# Patient Record
Sex: FEMALE | Race: NATIVE HAWAIIAN OR OTHER PACIFIC ISLANDER | NOT HISPANIC OR LATINO | URBAN - METROPOLITAN AREA
[De-identification: names, ages, dates, MRNs, and addresses within clinical notes are randomized per-mention and may not be internally consistent; named-entity substitution may affect disease eponyms.]

---

## 2024-10-28 ENCOUNTER — EMERGENCY (EMERGENCY)
Facility: HOSPITAL | Age: 34
LOS: 1 days | Discharge: HOME | End: 2024-10-28
Admitting: STUDENT IN AN ORGANIZED HEALTH CARE EDUCATION/TRAINING PROGRAM
Payer: COMMERCIAL

## 2024-10-28 VITALS
HEIGHT: 63 IN | OXYGEN SATURATION: 99 % | HEART RATE: 70 BPM | TEMPERATURE: 98 F | RESPIRATION RATE: 16 BRPM | DIASTOLIC BLOOD PRESSURE: 74 MMHG | SYSTOLIC BLOOD PRESSURE: 108 MMHG | WEIGHT: 149.91 LBS
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
## 2024-10-28 DIAGNOSIS — M79.662 PAIN IN LEFT LOWER LEG: ICD-10-CM
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
## 2024-10-28 DIAGNOSIS — M54.9 DORSALGIA, UNSPECIFIED: ICD-10-CM

## 2024-10-28 PROCEDURE — 71046 X-RAY EXAM CHEST 2 VIEWS: CPT | Mod: 26

## 2024-10-28 PROCEDURE — 73610 X-RAY EXAM OF ANKLE: CPT | Mod: 26,LT

## 2024-10-28 PROCEDURE — 71046 X-RAY EXAM CHEST 2 VIEWS: CPT

## 2024-10-28 PROCEDURE — 99284 EMERGENCY DEPT VISIT MOD MDM: CPT | Mod: 25

## 2024-10-28 PROCEDURE — 73610 X-RAY EXAM OF ANKLE: CPT

## 2024-10-28 PROCEDURE — 96372 THER/PROPH/DIAG INJ SC/IM: CPT

## 2024-10-28 PROCEDURE — 73590 X-RAY EXAM OF LOWER LEG: CPT | Mod: 26,LT

## 2024-10-28 PROCEDURE — 73564 X-RAY EXAM KNEE 4 OR MORE: CPT | Mod: 26,LT

## 2024-10-28 PROCEDURE — 73564 X-RAY EXAM KNEE 4 OR MORE: CPT

## 2024-10-28 PROCEDURE — 73590 X-RAY EXAM OF LOWER LEG: CPT

## 2024-10-28 PROCEDURE — 99284 EMERGENCY DEPT VISIT MOD MDM: CPT

## 2024-10-28 RX ORDER — KETOROLAC TROMETHAMINE 10 MG/1
15 TABLET, FILM COATED ORAL ONCE
Refills: 0 | Status: DISCONTINUED | OUTPATIENT
Start: 2024-10-28 | End: 2024-10-28

## 2024-10-28 RX ORDER — CYCLOBENZAPRINE HCL 10 MG
10 TABLET ORAL ONCE
Refills: 0 | Status: COMPLETED | OUTPATIENT
Start: 2024-10-28 | End: 2024-10-28

## 2024-10-28 RX ADMIN — KETOROLAC TROMETHAMINE 15 MILLIGRAM(S): 10 TABLET, FILM COATED ORAL at 14:32

## 2024-10-28 RX ADMIN — Medication 10 MILLIGRAM(S): at 14:32

## 2024-10-28 NOTE — ED ADULT NURSE NOTE - CHIEF COMPLAINT QUOTE
BIBEMS from [facility] shelter c/o LLE pain s/p "falling from a truck" yesterday. Notes RUE pain x1 week.

## 2024-10-28 NOTE — ED PROVIDER NOTE - CLINICAL SUMMARY MEDICAL DECISION MAKING FREE TEXT BOX
ID [id number]  34 F denies pmh p/w LLE and L back pain s/p fall yesterday approx 2 ft high out of truck, no loc or use of AC.  on exam pt comfortable appearing, mild ttp over L thoracic back, no ecchymosis or deformity;  RLE: minimal ttp over medial knee and medial ankle, scattered ecchymosis over R calf but compartments soft, no expanding hematoma, FROM all joints, SILT, brisk cap refill, DP/PT Pulse 2+.  suspect contusions but will obtain xrays R knee/tib/fib/ankle and cxr.  toradol and flexeril for pain

## 2024-10-28 NOTE — ED PROVIDER NOTE - PHYSICAL EXAMINATION
Vitals reviewed  Gen: comfortable appearing at rest, in nad, speaking in full sentences  Skin: wwp, no rash/lesions  HEENT: ncat, eomi, mmm  Back: no midline ttp/step off, mild ttp over L thoracic back, no ecchymosis or deformity   CV: rrr, no audible m/r/g  Resp: symmetrical expansion, ctab, no w/r/r  RLE: minimal ttp over medial knee and medial ankle, scattered ecchymosis over R calf but compartments soft, no expanding hematoma, FROM all joints, SILT, brisk cap refill, DP/PT Pulse 2+  FROM all other ext, NVI  tNeuro: alert/oriented, no focal deficits, steady gait without assistance

## 2024-10-28 NOTE — ED PROVIDER NOTE - PATIENT PORTAL LINK FT
You can access the FollowMyHealth Patient Portal offered by [facility] by registering at the following website: [email or web address]. By joining [facility]’s FollowMyHealth portal, you will also be able to view your health information using other applications (apps) compatible with our system.

## 2024-10-28 NOTE — ED PROVIDER NOTE - OBJECTIVE STATEMENT
ID [id number]  34 F denies pmh p/w LLE and L back pain s/p fall yesterday.  pt reports falling from truck approx 2 ft high onto L leg and then rolling onto back.  hit head but no loc or use of AC.  reports taking ibuprofen yesterday w/ some improvement but pain worse this morning.  denies f/c, open wounds, numbness/weakness, chest pain, sob, palpitations, abd pain, nvd, numbness/weakness, bowel or bladder dysfunction, or other injuries

## 2024-10-28 NOTE — ED ADULT TRIAGE NOTE - CHIEF COMPLAINT QUOTE
BIBEMS from [facility] shelter c/o LLE pain s/p "falling from a truck" yesterday. Notes RUE pain x1 week.

## 2025-03-12 NOTE — ED ADULT TRIAGE NOTE - TEMPERATURE IN CELSIUS (DEGREES C)
[Time Spent: ___ minutes] : I have spent [unfilled] minutes of time on the encounter which excludes teaching and separately reported services. 36.9